# Patient Record
Sex: FEMALE | Race: WHITE
[De-identification: names, ages, dates, MRNs, and addresses within clinical notes are randomized per-mention and may not be internally consistent; named-entity substitution may affect disease eponyms.]

---
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
## 2019-08-24 ENCOUNTER — HOSPITAL ENCOUNTER (EMERGENCY)
Dept: HOSPITAL 63 - ER | Age: 19
Discharge: HOME | End: 2019-08-24
Payer: COMMERCIAL

## 2019-08-24 VITALS — WEIGHT: 146.39 LBS | BODY MASS INDEX: 24.39 KG/M2 | HEIGHT: 65 IN

## 2019-08-24 VITALS
SYSTOLIC BLOOD PRESSURE: 115 MMHG | DIASTOLIC BLOOD PRESSURE: 67 MMHG | DIASTOLIC BLOOD PRESSURE: 67 MMHG | SYSTOLIC BLOOD PRESSURE: 115 MMHG

## 2019-08-24 DIAGNOSIS — N76.0: ICD-10-CM

## 2019-08-24 DIAGNOSIS — R19.7: Primary | ICD-10-CM

## 2019-08-24 DIAGNOSIS — B96.89: ICD-10-CM

## 2019-08-24 DIAGNOSIS — N39.0: ICD-10-CM

## 2019-08-24 DIAGNOSIS — J45.909: ICD-10-CM

## 2019-08-24 DIAGNOSIS — E87.6: ICD-10-CM

## 2019-08-24 LAB
ALBUMIN SERPL-MCNC: 4.3 G/DL (ref 3.4–5)
ALBUMIN/GLOB SERPL: 1 {RATIO} (ref 1–1.7)
ALP SERPL-CCNC: 46 U/L (ref 46–116)
ALT SERPL-CCNC: 11 U/L (ref 14–59)
ANION GAP SERPL CALC-SCNC: 11 MMOL/L (ref 6–14)
APTT PPP: YELLOW S
AST SERPL-CCNC: 13 U/L (ref 15–37)
BACTERIA #/AREA URNS HPF: (no result) /HPF
BASOPHILS # BLD AUTO: 0.1 X10^3/UL (ref 0–0.2)
BASOPHILS NFR BLD: 1 % (ref 0–3)
BILIRUB SERPL-MCNC: 0.3 MG/DL (ref 0.2–1)
BILIRUB UR QL STRIP: (no result)
BUN/CREAT SERPL: 12 (ref 6–20)
CA-I SERPL ISE-MCNC: 12 MG/DL (ref 7–20)
CALCIUM SERPL-MCNC: 8.4 MG/DL (ref 8.5–10.1)
CHLORIDE SERPL-SCNC: 102 MMOL/L (ref 98–107)
CO2 SERPL-SCNC: 26 MMOL/L (ref 21–32)
CREAT SERPL-MCNC: 1 MG/DL (ref 0.6–1)
EOSINOPHIL NFR BLD: 0.3 X10^3/UL (ref 0–0.7)
EOSINOPHIL NFR BLD: 5 % (ref 0–3)
ERYTHROCYTE [DISTWIDTH] IN BLOOD BY AUTOMATED COUNT: 12.3 % (ref 11.5–14.5)
FIBRINOGEN PPP-MCNC: (no result) MG/DL
GFR SERPLBLD BASED ON 1.73 SQ M-ARVRAT: 71.4 ML/MIN
GLOBULIN SER-MCNC: 4.2 G/DL (ref 2.2–3.8)
GLUCOSE SERPL-MCNC: 98 MG/DL (ref 70–99)
GLUCOSE UR STRIP-MCNC: (no result) MG/DL
HCT VFR BLD CALC: 40.3 % (ref 36–47)
HGB BLD-MCNC: 13.7 G/DL (ref 12–15.5)
LIPASE: 120 U/L (ref 73–393)
LYMPHOCYTES # BLD: 2.2 X10^3/UL (ref 1–4.8)
LYMPHOCYTES NFR BLD AUTO: 37 % (ref 24–48)
MCH RBC QN AUTO: 31 PG (ref 25–35)
MCHC RBC AUTO-ENTMCNC: 34 G/DL (ref 31–37)
MCV RBC AUTO: 92 FL (ref 79–100)
MONO #: 0.4 X10^3/UL (ref 0–1.1)
MONOCYTES NFR BLD: 7 % (ref 0–9)
NEUT #: 3 X10^3UL (ref 1.8–7.7)
NEUTROPHILS NFR BLD AUTO: 50 % (ref 31–73)
NITRITE UR QL STRIP: (no result)
PLATELET # BLD AUTO: 384 X10^3/UL (ref 140–400)
POTASSIUM SERPL-SCNC: 3.1 MMOL/L (ref 3.5–5.1)
PROT SERPL-MCNC: 8.5 G/DL (ref 6.4–8.2)
RBC # BLD AUTO: 4.38 X10^6/UL (ref 3.5–5.4)
RBC #/AREA URNS HPF: (no result) /HPF (ref 0–2)
SODIUM SERPL-SCNC: 139 MMOL/L (ref 136–145)
SP GR UR STRIP: >=1.03
SQUAMOUS #/AREA URNS LPF: (no result) /LPF
UROBILINOGEN UR-MCNC: 0.2 MG/DL
WBC # BLD AUTO: 6 X10^3/UL (ref 4–11)
WBC #/AREA URNS HPF: >40 /HPF (ref 0–4)

## 2019-08-24 PROCEDURE — 87086 URINE CULTURE/COLONY COUNT: CPT

## 2019-08-24 PROCEDURE — 87491 CHLMYD TRACH DNA AMP PROBE: CPT

## 2019-08-24 PROCEDURE — 99284 EMERGENCY DEPT VISIT MOD MDM: CPT

## 2019-08-24 PROCEDURE — 96374 THER/PROPH/DIAG INJ IV PUSH: CPT

## 2019-08-24 PROCEDURE — 83690 ASSAY OF LIPASE: CPT

## 2019-08-24 PROCEDURE — 36415 COLL VENOUS BLD VENIPUNCTURE: CPT

## 2019-08-24 PROCEDURE — 85025 COMPLETE CBC W/AUTO DIFF WBC: CPT

## 2019-08-24 PROCEDURE — 96361 HYDRATE IV INFUSION ADD-ON: CPT

## 2019-08-24 PROCEDURE — 81025 URINE PREGNANCY TEST: CPT

## 2019-08-24 PROCEDURE — 81001 URINALYSIS AUTO W/SCOPE: CPT

## 2019-08-24 PROCEDURE — 87591 N.GONORRHOEAE DNA AMP PROB: CPT

## 2019-08-24 PROCEDURE — 80053 COMPREHEN METABOLIC PANEL: CPT

## 2019-08-24 NOTE — PHYS DOC
Past History


Past Medical History:  Asthma


Past Surgical History:  No Surgical History


Smoking:  Non-smoker


Alcohol Use:  None


Drug Use:  None





Adult General


Chief Complaint


Chief Complaint:  ABDOMINAL PAIN





HPI


HPI





Patient is a 9-year-old female presents with diarrhea and "gas pains" diffusely

in her abdomen for the past 2 days. No blood in the stool. She threw up one time

this morning. No blood in the emesis. No sick family contacts. She is feeling 

fatigued generally. No chest pain or palpitations. Nothing makes the symptoms 

better or worse. There has been no out of the country travel. No fevers. She 

denies any vaginal bleeding or discharge. No recent antibiotic therapy.[]





Review of Systems


Review of Systems





Constitutional: Denies fever or chills []


Eyes: Denies change in visual acuity, redness, or eye pain []


HENT: Denies nasal congestion or sore throat []


Respiratory: Denies cough or shortness of breath []


Cardiovascular: No chest pain or palpitations[]


GI: See history of present illness[]


: Denies dysuria or hematuria, there is some urgency with urinating and 

frequency, with small amounts []


Musculoskeletal: Denies back pain or joint pain []


Integument: Denies rash or skin lesions []


Neurologic: Denies headache, focal weakness or sensory changes []


Endocrine: Denies polyuria or polydipsia []





All other systems were reviewed and found to be within normal limits, except as 

documented in this note.





Allergies


Allergies





Allergies








Coded Allergies Type Severity Reaction Last Updated Verified


 


  No Known Drug Allergies    3/23/17 No











Physical Exam


Physical Exam





Constitutional: Well developed, well nourished, no acute distress, non-toxic 

appearance. []


HENT: Normocephalic, atraumatic, bilateral external ears normal, oropharynx 

moist, no oral exudates, nose normal. []


Eyes: PERRLA, EOMI, conjunctiva normal, no discharge. [] 


Neck: Normal range of motion, no tenderness, supple, no stridor. [] 


Cardiovascular:Heart rate is tachycardic with a regular rhythm, no murmur []


Lungs & Thorax:  Bilateral breath sounds clear to auscultation []


Abdomen: Bowel sounds are increased, soft, no tenderness, no rebound, no 

guarding, no rigidity, able to sit up and lie back without any difficulty. No 

masses, no pulsatile masses. [] 


Skin: Warm, dry, no erythema, no rash. [] 


Back: No tenderness, no CVA tenderness. [] 


Extremities: No tenderness, no cyanosis, no clubbing, ROM intact, no edema. [] 


Neurologic: Alert and oriented X 3, normal motor function, normal sensory 

function, no focal deficits noted. []


Psychologic: Affect normal, judgement normal, mood normal. []





Current Patient Data


Lab Results





Laboratory Tests








Test


 8/24/19


13:35 8/24/19


13:37 8/24/19


13:45


 


Urine Collection Type Unknown   


 


Urine Color Yellow   


 


Urine Clarity Hazy   


 


Urine pH 5.0   


 


Urine Specific Gravity >=1.030   


 


Urine Protein Neg   


 


Urine Glucose (UA) Neg mg/dL   


 


Urine Ketones (Stick) Neg mg/dL   


 


Urine Blood Neg   


 


Urine Nitrite Neg   


 


Urine Bilirubin Neg   


 


Urine Urobilinogen Dipstick 0.2 mg/dL   


 


Urine Leukocyte Esterase Mod   


 


Urine RBC 1-2 /HPF   


 


Urine WBC >40 /HPF   


 


Urine Squamous Epithelial


Cells Mod /LPF 


 


 





 


Urine Bacteria Many /HPF   


 


Urine Mucus Marked /LPF   


 


White Blood Count  6.0 x10^3/uL  


 


Red Blood Count  4.38 x10^6/uL  


 


Hemoglobin  13.7 g/dL  


 


Hematocrit  40.3 %  


 


Mean Corpuscular Volume  92 fL  


 


Mean Corpuscular Hemoglobin  31 pg  


 


Mean Corpuscular Hemoglobin


Concent 


 34 g/dL 


 





 


Red Cell Distribution Width  12.3 %  


 


Platelet Count  384 x10^3/uL  


 


Neutrophils (%) (Auto)  50 %  


 


Lymphocytes (%) (Auto)  37 %  


 


Monocytes (%) (Auto)  7 %  


 


Eosinophils (%) (Auto)  5 %  


 


Basophils (%) (Auto)  1 %  


 


Neutrophils # (Auto)  3.0 x10^3uL  


 


Lymphocytes # (Auto)  2.2 x10^3/uL  


 


Monocytes # (Auto)  0.4 x10^3/uL  


 


Eosinophils # (Auto)  0.3 x10^3/uL  


 


Basophils # (Auto)  0.1 x10^3/uL  


 


Sodium Level  139 mmol/L  


 


Potassium Level  3.1 mmol/L  


 


Chloride Level  102 mmol/L  


 


Carbon Dioxide Level  26 mmol/L  


 


Anion Gap  11  


 


Blood Urea Nitrogen  12 mg/dL  


 


Creatinine  1.0 mg/dL  


 


Estimated GFR


(Cockcroft-Gault) 


 71.4 


 





 


BUN/Creatinine Ratio  12  


 


Glucose Level  98 mg/dL  


 


Calcium Level  8.4 mg/dL  


 


Total Bilirubin  0.3 mg/dL  


 


Aspartate Amino Transf


(AST/SGOT) 


 13 U/L 


 





 


Alanine Aminotransferase


(ALT/SGPT) 


 11 U/L 


 





 


Alkaline Phosphatase  46 U/L  


 


Total Protein  8.5 g/dL  


 


Albumin  4.3 g/dL  


 


Albumin/Globulin Ratio  1.0  


 


Lipase  120 U/L  


 


Bedside Urine HCG, Qualitative   hcg negative 








Current Medications








 Medications


  (Trade)  Dose


 Ordered  Sig/Fatemeh


 Route


 PRN Reason  Start Time


 Stop Time Status Last Admin


Dose Admin


 


 Lactated Ringer's  1,000 ml @ 


 1,000 mls/hr  Q1H


 IV


   8/24/19 13:24


 8/24/19 14:23  8/24/19 13:44





 


 Ondansetron HCl


  (Zofran)  4 mg  1X  ONCE


 IV


   8/24/19 13:45


 8/24/19 13:46 DC 8/24/19 13:41





 


 Hyoscyamine


  (Anaspaz)  0.25 mg  1X  ONCE


 PO


   8/24/19 13:45


 8/24/19 13:46 DC 8/24/19 13:44














EKG


EKG


[]





Radiology/Procedures


Radiology/Procedures


[]





Course & Med Decision Making


Course & Med Decision Making


Pertinent Labs and Imaging studies reviewed. (See chart for details)





ED course: Patient arrived, was placed in bed, and tolerated exam well. She had 

significant relief with the medicines and IV fluids administered. Her heart rate

 improved to the 70s. Discussed findings and plan with the patient and family wh

o voiced understanding. All questions were answered. She was discharged in 

improved condition.





Medical decision making: There is no evidence of an obstruction or perforation. 

No evidence of significant electrolyte abnormality. The low potassium is noted a

nd patient was instructed on foods to help with replacement. We will cover for 

urinary tract infection. No evidence of sepsis or pyelonephritis. Will also 

cover for bacterial vaginosis since clue cells were noted on the urine sample. 

Doubt C. difficile given no recent antibiotic therapy. This can be followed as 

an outpatient if current measures fail.[]





Dragon Disclaimer


Dragon Disclaimer


This electronic medical record was generated, in whole or in part, using a voice

 recognition dictation system.





Departure


Departure:


Impression:  


   Primary Impression:  


   Diarrhea


   Additional Impressions:  


   Hypokalemia


   Urinary tract infection


   Bacterial vaginosis


Disposition:  01 HOME, SELF-CARE


Condition:  IMPROVED


Referrals:  


ARIANE RM MD (PCP)


Follow-up in 2 days


Patient Instructions:  Bacterial Vaginosis, Diarrhea, Diet for Diarrhea, Adult, 

Hypokalemia, Urinary Tract Infection





Additional Instructions:  


Drink plenty of fluids, frequent small sips. No fatty foods, no milk, and no 

pepper for the next 48 hours. For the next 48 hours eat a diet rich in 

carbohydrates with foods such as bananas, rice, applesauce, and toast. Follow-up

 with your regular doctor in 2 days. Return to the ER if worsening pain, blood 

in the stool or emesis, or any other concerns.


Scripts


Ondansetron Hcl (ZOFRAN) 4 Mg Tablet


1 TAB PO Q6HRS for nausea or vomiting, #20 TAB


   Prov: ARTIE KWON DO         8/24/19 


Metronidazole (FLAGYL) 500 Mg Tablet


1 TAB PO BID for bacterial vaginosis, #14 TAB


   Prov: ARTIE KWON DO         8/24/19 


Hyoscyamine Sulfate (LEVSIN) 0.125 Mg Tablet


0.125 MG PO QID for abdominal pain/cramping, #30 TAB


   Prov: ARTIE KWON DO         8/24/19 


Sulfamethoxazole/Trimethoprim (BACTRIM DS TABLET) 1 Each Tablet


1 TAB PO BID for urinary tract infection, #20 TAB


   Prov: ARTIE KWON DO         8/24/19





Problem Qualifiers








   Primary Impression:  


   Diarrhea


   Diarrhea type:  unspecified type  Qualified Codes:  R19.7 - Diarrhea, 

   unspecified


   Additional Impressions:  


   Urinary tract infection


   Urinary tract infection type:  site unspecified  Hematuria presence:  without

    hematuria  Qualified Codes:  N39.0 - Urinary tract infection, site not 

   specified








ARTIE KWON DO          Aug 24, 2019 13:31

## 2020-08-06 ENCOUNTER — HOSPITAL ENCOUNTER (OUTPATIENT)
Dept: HOSPITAL 63 - US | Age: 20
End: 2020-08-06
Attending: FAMILY MEDICINE
Payer: COMMERCIAL

## 2020-08-06 DIAGNOSIS — N94.6: Primary | ICD-10-CM

## 2020-08-06 PROCEDURE — 76856 US EXAM PELVIC COMPLETE: CPT

## 2020-08-06 PROCEDURE — 76830 TRANSVAGINAL US NON-OB: CPT

## 2020-08-06 NOTE — RAD
Transabdominal and endovaginal pelvic ultrasound without comparison for 

dysmenorrhea.

 

TECHNIQUE AND FINDINGS: Real-time grayscale and color Doppler evaluation 

of the pelvic organs is performed from both transabdominal and endovaginal

approach. The urinary bladder is decompressed limiting the acoustic window

for transabdominal imaging. The uterus measures 8.4 x 5.2 x 4.1 cm and is 

normal in appearance with no myometrial abnormalities. Endometrium 

measures 2 mm in thickness, also normal. No cervical abnormalities are 

identified. The right ovary measures 5.1 x 2.6 x 2.6 cm and demonstrates 

normal blood flow. There is an eccentric simple appearing 3.6 cm cyst for 

which no additional follow-up is required. The left ovary measures 3.4 x 

2.1 x 2.1 cm and demonstrates normal blood flow and normal follicular 

character. There is no free fluid identified. No masses are seen.

 

IMPRESSION:

1. No sonographically discernible pelvic abnormalities.

 

Electronically signed by: Wesly Valiente MD (8/6/2020 10:53 AM) [id number]

## 2021-03-31 ENCOUNTER — HOSPITAL ENCOUNTER (OUTPATIENT)
Dept: HOSPITAL 61 - RAD | Age: 21
End: 2021-03-31
Attending: INTERNAL MEDICINE
Payer: COMMERCIAL

## 2021-03-31 DIAGNOSIS — J45.909: Primary | ICD-10-CM

## 2021-03-31 PROCEDURE — 71046 X-RAY EXAM CHEST 2 VIEWS: CPT

## 2021-03-31 NOTE — RAD
Exam Date:  3/31/2021 1:18 PM



XR CHEST 2V



Indication: Reason: ASTHMA / Spl. Instructions:  / History:  







FINDINGS/

IMPRESSION:





The cardiac silhouette and pulmonary vasculature are within normal limits.  

There is no focal consolidation, pleural effusion or pneumothorax.  

The visualized osseous structures are intact.





Electronically signed by: King Florentino MD (3/31/2021 4:10 PM) [id number]

## 2021-05-09 ENCOUNTER — HOSPITAL ENCOUNTER (EMERGENCY)
Dept: HOSPITAL 63 - ER | Age: 21
Discharge: HOME | End: 2021-05-09
Payer: COMMERCIAL

## 2021-05-09 VITALS
SYSTOLIC BLOOD PRESSURE: 120 MMHG | SYSTOLIC BLOOD PRESSURE: 120 MMHG | DIASTOLIC BLOOD PRESSURE: 72 MMHG | DIASTOLIC BLOOD PRESSURE: 72 MMHG

## 2021-05-09 VITALS — WEIGHT: 153.22 LBS | HEIGHT: 66 IN | BODY MASS INDEX: 24.62 KG/M2

## 2021-05-09 DIAGNOSIS — J45.909: ICD-10-CM

## 2021-05-09 DIAGNOSIS — J02.8: Primary | ICD-10-CM

## 2021-05-09 DIAGNOSIS — B97.89: ICD-10-CM

## 2021-05-09 PROCEDURE — 99283 EMERGENCY DEPT VISIT LOW MDM: CPT

## 2021-05-09 PROCEDURE — 87880 STREP A ASSAY W/OPTIC: CPT

## 2021-05-09 PROCEDURE — 87070 CULTURE OTHR SPECIMN AEROBIC: CPT

## 2021-05-09 NOTE — PHYS DOC
Past History


Past Medical History:  Asthma


Past Surgical History:  No Surgical History


Smoking:  Non-smoker


Alcohol Use:  None


Drug Use:  None





General Adult


EDM:


Chief Complaint:  SORE THROAT





HPI:


HPI:


20-year-old female presents with sore throat.  She states that she woke up this 

morning with a sore throat and is gotten worse throughout the day.  It hurts 

more with swallowing.  She is concerned about strep.  Patient admits to having 

seasonal allergies and nasal drainage from this.  She has no other complaints at

this time.  Denies fever or chills.





Review of Systems:


Review of Systems:


Constitutional:  Denies fever or chills 


Eyes:  Denies change in visual acuity 


HENT:  sore throat 


Respiratory:  Denies cough or shortness of breath 


Cardiovascular:  Denies chest pain or edema 


GI:  Denies abdominal pain, nausea, vomiting, bloody stools or diarrhea 


: Denies dysuria 


Musculoskeletal:  Denies back pain or joint pain 


Integument:  Denies rash 


Neurologic:  Denies headache, focal weakness or sensory changes 


Endocrine:  Denies polyuria or polydipsia 


Lymphatic:  Denies swollen glands 


Psychiatric:  Denies depression or anxiety





Allergies:


Allergies:





Allergies








Coded Allergies Type Severity Reaction Last Updated Verified


 


  No Known Drug Allergies    3/23/17 No











Physical Exam:


PE:





Constitutional: Well developed, well nourished, no acute distress, non-toxic 

appearance. []


HENT: Normocephalic, atraumatic, bilateral external ears normal, oropharynx 

moist, no oral exudates, nose normal. []


Eyes: PERRLA, EOMI, conjunctiva normal, no discharge. [] 


Neck: Normal range of motion, no tenderness, supple, no stridor.  Mild swelling 

of the left anterior cervical lymph node [] 


Cardiovascular:Heart rate regular rhythm, no murmur []


Lungs & Thorax:  Bilateral breath sounds clear to auscultation []


Abdomen: Bowel sounds normal, soft, no tenderness, no masses, no pulsatile 

masses. [] 


Skin: Warm, dry, no erythema, no rash. [] 


Back: No tenderness, no CVA tenderness. [] 


Extremities: No tenderness, no cyanosis, no clubbing, ROM intact, no edema. [] 


Neurologic: Alert and oriented X 3, normal motor function, normal sensory 

function, no focal deficits noted. []


Psychologic: Affect normal, judgement normal, mood normal. []





EKG:


EKG:


[]





Radiology/Procedures:


Radiology/Procedures:


[]





Heart Score:


C/O Chest Pain:  N/A


Risk Factors:


Risk Factors:  DM, Current or recent (<one month) smoker, HTN, HLP, family 

history of CAD, obesity.


Risk Scores:


Score 0 - 3:  2.5% MACE over next 6 weeks - Discharge Home


Score 4 - 6:  20.3% MACE over next 6 weeks - Admit for Clinical Observation


Score 7 - 10:  72.7% MACE over next 6 weeks - Early Invasive Strategies





Course & Med Decision Making:


Course & Med Decision Making


Pertinent Labs and Imaging studies reviewed. (See chart for details)


The patient's rapid strep is negative.  It appears as though her sore throat is 

either allergy induced due to her nasal drainage or viral illness.  She is 

stable for discharge at this time.


[]





Dragon Disclaimer:


Dragon Disclaimer:


This electronic medical record was generated, in whole or in part, using a voice

 recognition dictation system.





Departure


Departure:


Impression:  


   Primary Impression:  


   Sore throat (viral)


Disposition:  01 HOME / SELF CARE / HOMELESS


Condition:  STABLE


Referrals:  


ARIANE RM MD (PCP)


Patient Instructions:  Sore Throat, Easy-to-Read











XANDER AMAYA DO                  May 9, 2021 19:14

## 2021-12-31 ENCOUNTER — HOSPITAL ENCOUNTER (OUTPATIENT)
Dept: HOSPITAL 63 - RAD | Age: 21
End: 2021-12-31
Attending: FAMILY MEDICINE
Payer: COMMERCIAL

## 2021-12-31 DIAGNOSIS — N13.6: Primary | ICD-10-CM

## 2021-12-31 PROCEDURE — 74176 CT ABD & PELVIS W/O CONTRAST: CPT

## 2021-12-31 NOTE — RAD
EXAMINATION: CT abdomen and pelvis without IV contrast.



INDICATION:21 years, Female, pyelonephritis, increased urgency to urinate.



TECHNIQUE: Axial CT images of the abdomen and pelvis were obtained. Coronal and sagittal reformatted 
performed.



COMPARISON:  None.



Exposure: One or more of the following individualized dose reduction techniques were utilized for thi
s examination:  

1. Automated exposure control  

2. Adjustment of the mA and/or kV according to patient size  

3. Use of iterative reconstruction technique.



FINDINGS:



LOWER CHEST:

Visualized lung bases are clear.   



ABDOMEN/PELVIS:

The liver is normal in size and attenuation in the spleen, adrenals, and pancreas are unremarkable. T
he kidneys are symmetric in size with no discrete focal abnormality on this noncontrast exam. No appa
rent perinephric inflammation. No nephrolithiasis or hydroureteronephrosis.



Stomach is distended with ingested debris. No evidence of bowel obstruction or focal inflammation. Th
ere is a moderate to large amount stool within the rectum. There is moderate amount of air and stool 
seen throughout the colon. The appendix is not definitely visualized. No pathologically enlarged abdo
dar or pelvic adenopathy. Vasculature appears normal in course and caliber.



Bladder is partially decompressed precluding complete evaluation. Anteverted normal appearing uterus 
is present. Ovaries are normal in appearance for patient's age. There is a trace amount of simple thuan
earing fluid in the pelvis, likely physiologic.



Anterior abdominal wall shows no evidence of acute process. No acute or suspicious osseous abnormalit
ies. 



IMPRESSION:

1. No imaging findings to suggest pyelonephritis. Correlation with urinalysis is recommended.

2. Moderate colorectal stool burden suggesting moderate constipation. Clinical correlation is advised
.

3. Other chronic/incidental findings, as above.



Electronically signed by: Kenroy Cain DO (12/31/2021 8:50 PM) [facility]

## 2022-01-02 ENCOUNTER — HOSPITAL ENCOUNTER (EMERGENCY)
Dept: HOSPITAL 63 - ER | Age: 22
Discharge: HOME | End: 2022-01-02
Payer: COMMERCIAL

## 2022-01-02 VITALS — DIASTOLIC BLOOD PRESSURE: 74 MMHG | SYSTOLIC BLOOD PRESSURE: 132 MMHG

## 2022-01-02 VITALS — WEIGHT: 153.22 LBS | BODY MASS INDEX: 24.62 KG/M2 | HEIGHT: 66 IN

## 2022-01-02 DIAGNOSIS — J45.909: ICD-10-CM

## 2022-01-02 DIAGNOSIS — R35.0: Primary | ICD-10-CM

## 2022-01-02 LAB
ALBUMIN SERPL-MCNC: 3.8 G/DL (ref 3.4–5)
ALBUMIN/GLOB SERPL: 1 {RATIO} (ref 1–1.7)
ALP SERPL-CCNC: 34 U/L (ref 46–116)
ALT SERPL-CCNC: 35 U/L (ref 14–59)
AMPHETAMINE/METHAMPHETAMINE: (no result)
ANION GAP SERPL CALC-SCNC: 11 MMOL/L (ref 6–14)
APTT PPP: (no result) S
AST SERPL-CCNC: 151 U/L (ref 15–37)
BACTERIA #/AREA URNS HPF: (no result) /HPF
BARBITURATES UR-MCNC: (no result) UG/ML
BASOPHILS # BLD AUTO: 0.1 X10^3/UL (ref 0–0.2)
BASOPHILS NFR BLD: 1 % (ref 0–3)
BENZODIAZ UR-MCNC: (no result) UG/L
BILIRUB SERPL-MCNC: 0.2 MG/DL (ref 0.2–1)
BILIRUB UR QL STRIP: (no result)
BUN/CREAT SERPL: 10 (ref 6–20)
CA-I SERPL ISE-MCNC: 9 MG/DL (ref 7–20)
CALCIUM SERPL-MCNC: 8 MG/DL (ref 8.5–10.1)
CANNABINOIDS UR-MCNC: (no result) UG/L
CHLORIDE SERPL-SCNC: 104 MMOL/L (ref 98–107)
CO2 SERPL-SCNC: 24 MMOL/L (ref 21–32)
COCAINE UR-MCNC: (no result) NG/ML
CREAT SERPL-MCNC: 0.9 MG/DL (ref 0.6–1)
EOSINOPHIL NFR BLD: 0.5 X10^3/UL (ref 0–0.7)
EOSINOPHIL NFR BLD: 7 % (ref 0–3)
ERYTHROCYTE [DISTWIDTH] IN BLOOD BY AUTOMATED COUNT: 12.9 % (ref 11.5–14.5)
FIBRINOGEN PPP-MCNC: CLEAR MG/DL
GFR SERPLBLD BASED ON 1.73 SQ M-ARVRAT: 78.3 ML/MIN
GLOBULIN SER-MCNC: 3.7 G/DL (ref 2.2–3.8)
GLUCOSE SERPL-MCNC: 83 MG/DL (ref 70–99)
GLUCOSE UR STRIP-MCNC: (no result) MG/DL
HCT VFR BLD CALC: 34.4 % (ref 36–47)
HGB BLD-MCNC: 11.4 G/DL (ref 12–15.5)
LYMPHOCYTES # BLD: 2.3 X10^3/UL (ref 1–4.8)
LYMPHOCYTES NFR BLD AUTO: 29 % (ref 24–48)
MCH RBC QN AUTO: 30 PG (ref 25–35)
MCHC RBC AUTO-ENTMCNC: 33 G/DL (ref 31–37)
MCV RBC AUTO: 92 FL (ref 79–100)
METHADONE SERPL-MCNC: (no result) NG/ML
MONO #: 0.6 X10^3/UL (ref 0–1.1)
MONOCYTES NFR BLD: 8 % (ref 0–9)
NEUT #: 4.3 X10^3UL (ref 1.8–7.7)
NEUTROPHILS NFR BLD AUTO: 55 % (ref 31–73)
NITRITE UR QL STRIP: (no result)
OPIATES UR-MCNC: (no result) NG/ML
PCP SERPL-MCNC: (no result) MG/DL
PLATELET # BLD AUTO: 297 X10^3/UL (ref 140–400)
POTASSIUM SERPL-SCNC: 3.9 MMOL/L (ref 3.5–5.1)
PROT SERPL-MCNC: 7.5 G/DL (ref 6.4–8.2)
RBC # BLD AUTO: 3.74 X10^6/UL (ref 3.5–5.4)
RBC #/AREA URNS HPF: 0 /HPF (ref 0–2)
SODIUM SERPL-SCNC: 139 MMOL/L (ref 136–145)
SP GR UR STRIP: 1.01
SQUAMOUS #/AREA URNS LPF: (no result) /LPF
UROBILINOGEN UR-MCNC: 0.2 MG/DL
WBC # BLD AUTO: 7.7 X10^3/UL (ref 4–11)
WBC #/AREA URNS HPF: (no result) /HPF (ref 0–4)

## 2022-01-02 PROCEDURE — 96360 HYDRATION IV INFUSION INIT: CPT

## 2022-01-02 PROCEDURE — 85025 COMPLETE CBC W/AUTO DIFF WBC: CPT

## 2022-01-02 PROCEDURE — 87491 CHLMYD TRACH DNA AMP PROBE: CPT

## 2022-01-02 PROCEDURE — 87591 N.GONORRHOEAE DNA AMP PROB: CPT

## 2022-01-02 PROCEDURE — 36415 COLL VENOUS BLD VENIPUNCTURE: CPT

## 2022-01-02 PROCEDURE — 80307 DRUG TEST PRSMV CHEM ANLYZR: CPT

## 2022-01-02 PROCEDURE — 80053 COMPREHEN METABOLIC PANEL: CPT

## 2022-01-02 PROCEDURE — 99283 EMERGENCY DEPT VISIT LOW MDM: CPT

## 2022-01-02 PROCEDURE — 81001 URINALYSIS AUTO W/SCOPE: CPT

## 2022-01-02 PROCEDURE — 81025 URINE PREGNANCY TEST: CPT

## 2022-01-02 NOTE — PHYS DOC
Past History


Past Medical History:  Asthma


Past Surgical History:  No Surgical History


Smoking:  Non-smoker


Alcohol Use:  None


Drug Use:  None





General Adult


EDM:


Chief Complaint:  URINARY FREQUENCY





HPI:


HPI:


21-year-old female presents with urinary frequency.  The patient has been having

these symptoms on and off for at least a month.  She has been treated for UTIs 

twice.  Her symptoms seem to improve and then they come back.  She is sexually 

active.  She denies putting anything into her urethra.  She has not seen a 

urologist yet.  She denies fever or chills.





Review of Systems:


Review of Systems:


Constitutional:  Denies fever or chills 


Eyes:  Denies change in visual acuity 


HENT:  Denies nasal congestion or sore throat 


Respiratory:  Denies cough or shortness of breath 


Cardiovascular:  Denies chest pain or edema 


GI:  Denies abdominal pain, nausea, vomiting, bloody stools or diarrhea 


: Urinary frequency


Musculoskeletal:  Denies back pain or joint pain 


Integument:  Denies rash 


Neurologic:  Denies headache, focal weakness or sensory changes 


Endocrine:  Denies polyuria or polydipsia 


Lymphatic:  Denies swollen glands 


Psychiatric:  Denies depression or anxiety





Current Medications:


Current Meds:





Current Medications








 Medications


  (Trade)  Dose


 Ordered  Sig/Fatemeh  Start Time


 Stop Time Status Last Admin


Dose Admin


 


 Sodium Chloride  1,000 ml @ 


 1,000 mls/hr  1X  ONCE  1/2/22 03:30


 1/2/22 04:29 DC 1/2/22 03:18


1,000 MLS/HR











Allergies:


Allergies:





Allergies








Coded Allergies Type Severity Reaction Last Updated Verified


 


  No Known Drug Allergies    3/23/17 No











Physical Exam:


PE:





Constitutional: Well developed, well nourished, no acute distress, non-toxic 

appearance. []


HENT: Normocephalic, atraumatic, bilateral external ears normal, oropharynx 

moist, no oral exudates, nose normal. []


Eyes: PERRLA, EOMI, conjunctiva normal, no discharge. [] 


Neck: Normal range of motion, no tenderness, supple, no stridor. [] 


Cardiovascular: Heart rate regular rhythm, no murmur []


Lungs & Thorax:  Bilateral breath sounds clear to auscultation []


Abdomen: Bowel sounds normal, soft, no tenderness, no masses, no pulsatile 

masses. [] 


Skin: Warm, dry, no erythema, no rash. [] 


Back: No tenderness, no CVA tenderness. [] 


Extremities: No tenderness, no cyanosis, no clubbing, ROM intact, no edema. [] 


Neurologic: Alert and oriented X 3, normal motor function, normal sensory 

function, no focal deficits noted. []


Psychologic: Affect normal, judgement normal, mood anxious. 


: Normal external genitalia, clear to white vaginal discharge, no foul odor, 

no pain with exam.  []





Current Patient Data:


Labs:





                                Laboratory Tests








Test


 1/2/22


01:40 1/2/22


01:45 1/2/22


03:15


 


Urine Collection Type Unknown    


 


Urine Color Straw    


 


Urine Clarity Clear    


 


Urine pH 6.5    


 


Urine Specific Gravity 1.010    


 


Urine Protein


 Neg


(NEG-TRACE) 


 





 


Urine Glucose (UA)


 Neg mg/dL


(NEG) 


 





 


Urine Ketones (Stick)


 Neg mg/dL


(NEG) 


 





 


Urine Blood Neg (NEG)    


 


Urine Nitrite Neg (NEG)    


 


Urine Bilirubin Neg (NEG)    


 


Urine Urobilinogen Dipstick


 0.2 mg/dL (0.2


mg/dL) 


 





 


Urine Leukocyte Esterase Neg (NEG)    


 


Urine RBC 0 /HPF (0-2)    


 


Urine WBC


 Occ /HPF (0-4)


 


 





 


Urine Squamous Epithelial


Cells Few /LPF  


 


 





 


Urine Bacteria


 Few /HPF


(0-FEW) 


 





 


Urine Opiates Screen Neg (NEG)    


 


Urine Methadone Screen Neg (NEG)    


 


Urine Barbiturates Neg (NEG)    


 


Urine Phencyclidine Screen Neg (NEG)    


 


Urine


Amphetamine/Methamphetamine Neg (NEG)  


 


 





 


Urine Benzodiazepines Screen Neg (NEG)    


 


Urine Cocaine Screen Neg (NEG)    


 


Urine Cannabinoids Screen Neg (NEG)    


 


Urine Ethyl Alcohol Neg (NEG)    


 


POC Urine HCG, Qualitative


 


 hcg negative


(Negative) 





 


White Blood Count


 


 


 7.7 x10^3/uL


(4.0-11.0)


 


Red Blood Count


 


 


 3.74 x10^6/uL


(3.50-5.40)


 


Hemoglobin


 


 


 11.4 g/dL


(12.0-15.5)  L


 


Hematocrit


 


 


 34.4 %


(36.0-47.0)  L


 


Mean Corpuscular Volume


 


 


 92 fL ()





 


Mean Corpuscular Hemoglobin   30 pg (25-35)  


 


Mean Corpuscular Hemoglobin


Concent 


 


 33 g/dL


(31-37)


 


Red Cell Distribution Width


 


 


 12.9 %


(11.5-14.5)


 


Platelet Count


 


 


 297 x10^3/uL


(140-400)


 


Neutrophils (%) (Auto)   55 % (31-73)  


 


Lymphocytes (%) (Auto)   29 % (24-48)  


 


Monocytes (%) (Auto)   8 % (0-9)  


 


Eosinophils (%) (Auto)   7 % (0-3)  H


 


Basophils (%) (Auto)   1 % (0-3)  


 


Neutrophils # (Auto)


 


 


 4.3 x10^3uL


(1.8-7.7)


 


Lymphocytes # (Auto)


 


 


 2.3 x10^3/uL


(1.0-4.8)


 


Monocytes # (Auto)


 


 


 0.6 x10^3/uL


(0.0-1.1)


 


Eosinophils # (Auto)


 


 


 0.5 x10^3/uL


(0.0-0.7)


 


Basophils # (Auto)


 


 


 0.1 x10^3/uL


(0.0-0.2)


 


Sodium Level


 


 


 139 mmol/L


(136-145)


 


Potassium Level


 


 


 3.9 mmol/L


(3.5-5.1)


 


Chloride Level


 


 


 104 mmol/L


()


 


Carbon Dioxide Level


 


 


 24 mmol/L


(21-32)


 


Anion Gap   11 (6-14)  


 


Blood Urea Nitrogen


 


 


 9 mg/dL (7-20)





 


Creatinine


 


 


 0.9 mg/dL


(0.6-1.0)


 


Estimated GFR


(Cockcroft-Gault) 


 


 78.3  





 


BUN/Creatinine Ratio   10 (6-20)  


 


Glucose Level


 


 


 83 mg/dL


(70-99)


 


Calcium Level


 


 


 8.0 mg/dL


(8.5-10.1)  L


 


Total Bilirubin


 


 


 0.2 mg/dL


(0.2-1.0)


 


Aspartate Amino Transferase


(AST) 


 


 151 U/L


(15-37)  H


 


Alanine Aminotransferase (ALT)


 


 


 35 U/L (14-59)





 


Alkaline Phosphatase


 


 


 34 U/L


()  L


 


Total Protein


 


 


 7.5 g/dL


(6.4-8.2)


 


Albumin


 


 


 3.8 g/dL


(3.4-5.0)


 


Albumin/Globulin Ratio   1.0 (1.0-1.7)  








Microbiology


1/2/22 Wet Prep - Final, Complete


Vital Signs:





                                   Vital Signs








  Date Time  Temp Pulse Resp B/P (MAP) Pulse Ox O2 Delivery O2 Flow Rate FiO2


 


1/2/22 01:50 97.8 91 16 127/64 (85) 100   











EKG:


EKG:


[]





Radiology/Procedures:


Radiology/Procedures:


[]





Heart Score:


C/O Chest Pain:  N/A


Risk Factors:


Risk Factors:  DM, Current or recent (<one month) smoker, HTN, HLP, family 

history of CAD, obesity.


Risk Scores:


Score 0 - 3:  2.5% MACE over next 6 weeks - Discharge Home


Score 4 - 6:  20.3% MACE over next 6 weeks - Admit for Clinical Observation


Score 7 - 10:  72.7% MACE over next 6 weeks - Early Invasive Strategies





Course & Med Decision Making:


Course & Med Decision Making


Pertinent Labs and Imaging studies reviewed. (See chart for details)


The patient's labs are unremarkable.  Her urinalysis is unremarkable.  Her wet 

prep is negative.  GC chlamydia is pending.  I have advised that she follow-up 

with urology as previously planned.  She is stable for discharge at this time.


[]





Dragon Disclaimer:


Dragon Disclaimer:


This electronic medical record was generated, in whole or in part, using a voice

 recognition dictation system.





Departure


Departure:


Impression:  


   Primary Impression:  


   Urinary frequency


Disposition:  01 HOME / SELF CARE / HOMELESS


Condition:  STABLE


Referrals:  


JUDITH CHIN MD (PCP)


Patient Instructions:  Urinary Frequency











XANDER AMAYA DO                  Jan 2, 2022 04:45

## 2022-01-06 ENCOUNTER — HOSPITAL ENCOUNTER (EMERGENCY)
Dept: HOSPITAL 63 - ER | Age: 22
Discharge: HOME | End: 2022-01-06
Payer: COMMERCIAL

## 2022-01-06 VITALS — HEIGHT: 66 IN | WEIGHT: 150.36 LBS | BODY MASS INDEX: 24.16 KG/M2

## 2022-01-06 VITALS
SYSTOLIC BLOOD PRESSURE: 111 MMHG | SYSTOLIC BLOOD PRESSURE: 111 MMHG | DIASTOLIC BLOOD PRESSURE: 68 MMHG | DIASTOLIC BLOOD PRESSURE: 68 MMHG

## 2022-01-06 DIAGNOSIS — R55: Primary | ICD-10-CM

## 2022-01-06 DIAGNOSIS — J45.909: ICD-10-CM

## 2022-01-06 DIAGNOSIS — Z20.822: ICD-10-CM

## 2022-01-06 DIAGNOSIS — R35.0: ICD-10-CM

## 2022-01-06 DIAGNOSIS — R41.0: ICD-10-CM

## 2022-01-06 LAB
ALBUMIN SERPL-MCNC: 3.8 G/DL (ref 3.4–5)
ALBUMIN/GLOB SERPL: 1 {RATIO} (ref 1–1.7)
ALP SERPL-CCNC: 34 U/L (ref 46–116)
ALT SERPL-CCNC: 59 U/L (ref 14–59)
AMPHETAMINE/METHAMPHETAMINE: (no result)
ANION GAP SERPL CALC-SCNC: 13 MMOL/L (ref 6–14)
APTT PPP: YELLOW S
AST SERPL-CCNC: 91 U/L (ref 15–37)
BACTERIA #/AREA URNS HPF: (no result) /HPF
BARBITURATES UR-MCNC: (no result) UG/ML
BASOPHILS # BLD AUTO: 0.1 X10^3/UL (ref 0–0.2)
BASOPHILS NFR BLD: 1 % (ref 0–3)
BENZODIAZ UR-MCNC: (no result) UG/L
BILIRUB SERPL-MCNC: 0.3 MG/DL (ref 0.2–1)
BILIRUB UR QL STRIP: (no result)
BUN/CREAT SERPL: 11 (ref 6–20)
CA-I SERPL ISE-MCNC: 10 MG/DL (ref 7–20)
CALCIUM SERPL-MCNC: 8.3 MG/DL (ref 8.5–10.1)
CANNABINOIDS UR-MCNC: (no result) UG/L
CHLORIDE SERPL-SCNC: 104 MMOL/L (ref 98–107)
CO2 SERPL-SCNC: 23 MMOL/L (ref 21–32)
COCAINE UR-MCNC: (no result) NG/ML
CREAT SERPL-MCNC: 0.9 MG/DL (ref 0.6–1)
EOSINOPHIL NFR BLD: 0.3 X10^3/UL (ref 0–0.7)
EOSINOPHIL NFR BLD: 5 % (ref 0–3)
ERYTHROCYTE [DISTWIDTH] IN BLOOD BY AUTOMATED COUNT: 12.9 % (ref 11.5–14.5)
FIBRINOGEN PPP-MCNC: CLEAR MG/DL
GFR SERPLBLD BASED ON 1.73 SQ M-ARVRAT: 78.3 ML/MIN
GLOBULIN SER-MCNC: 3.8 G/DL (ref 2.2–3.8)
GLUCOSE SERPL-MCNC: 106 MG/DL (ref 70–99)
GLUCOSE UR STRIP-MCNC: (no result) MG/DL
HCT VFR BLD CALC: 37 % (ref 36–47)
HGB BLD-MCNC: 12.4 G/DL (ref 12–15.5)
LYMPHOCYTES # BLD: 2.4 X10^3/UL (ref 1–4.8)
LYMPHOCYTES NFR BLD AUTO: 45 % (ref 24–48)
MCH RBC QN AUTO: 31 PG (ref 25–35)
MCHC RBC AUTO-ENTMCNC: 34 G/DL (ref 31–37)
MCV RBC AUTO: 92 FL (ref 79–100)
METHADONE SERPL-MCNC: (no result) NG/ML
MONO #: 0.3 X10^3/UL (ref 0–1.1)
MONOCYTES NFR BLD: 6 % (ref 0–9)
NEUT #: 2.3 X10^3UL (ref 1.8–7.7)
NEUTROPHILS NFR BLD AUTO: 43 % (ref 31–73)
NITRITE UR QL STRIP: (no result)
OPIATES UR-MCNC: (no result) NG/ML
PCP SERPL-MCNC: (no result) MG/DL
PLATELET # BLD AUTO: 359 X10^3/UL (ref 140–400)
POTASSIUM SERPL-SCNC: 4.1 MMOL/L (ref 3.5–5.1)
PROT SERPL-MCNC: 7.6 G/DL (ref 6.4–8.2)
RBC # BLD AUTO: 4.03 X10^6/UL (ref 3.5–5.4)
RBC #/AREA URNS HPF: 0 /HPF (ref 0–2)
SODIUM SERPL-SCNC: 140 MMOL/L (ref 136–145)
SP GR UR STRIP: >=1.03
SQUAMOUS #/AREA URNS LPF: (no result) /LPF
UROBILINOGEN UR-MCNC: 0.2 MG/DL
WBC # BLD AUTO: 5.4 X10^3/UL (ref 4–11)
WBC #/AREA URNS HPF: (no result) /HPF (ref 0–4)

## 2022-01-06 PROCEDURE — 96361 HYDRATE IV INFUSION ADD-ON: CPT

## 2022-01-06 PROCEDURE — 70450 CT HEAD/BRAIN W/O DYE: CPT

## 2022-01-06 PROCEDURE — C9803 HOPD COVID-19 SPEC COLLECT: HCPCS

## 2022-01-06 PROCEDURE — 81025 URINE PREGNANCY TEST: CPT

## 2022-01-06 PROCEDURE — 93005 ELECTROCARDIOGRAM TRACING: CPT

## 2022-01-06 PROCEDURE — 81001 URINALYSIS AUTO W/SCOPE: CPT

## 2022-01-06 PROCEDURE — 80307 DRUG TEST PRSMV CHEM ANLYZR: CPT

## 2022-01-06 PROCEDURE — 86592 SYPHILIS TEST NON-TREP QUAL: CPT

## 2022-01-06 PROCEDURE — 85025 COMPLETE CBC W/AUTO DIFF WBC: CPT

## 2022-01-06 PROCEDURE — 80053 COMPREHEN METABOLIC PANEL: CPT

## 2022-01-06 PROCEDURE — 71045 X-RAY EXAM CHEST 1 VIEW: CPT

## 2022-01-06 PROCEDURE — U0003 INFECTIOUS AGENT DETECTION BY NUCLEIC ACID (DNA OR RNA); SEVERE ACUTE RESPIRATORY SYNDROME CORONAVIRUS 2 (SARS-COV-2) (CORONAVIRUS DISEASE [COVID-19]), AMPLIFIED PROBE TECHNIQUE, MAKING USE OF HIGH THROUGHPUT TECHNOLOGIES AS DESCRIBED BY CMS-2020-01-R: HCPCS

## 2022-01-06 PROCEDURE — 99285 EMERGENCY DEPT VISIT HI MDM: CPT

## 2022-01-06 PROCEDURE — 36415 COLL VENOUS BLD VENIPUNCTURE: CPT

## 2022-01-06 PROCEDURE — 87086 URINE CULTURE/COLONY COUNT: CPT

## 2022-01-06 PROCEDURE — 96360 HYDRATION IV INFUSION INIT: CPT

## 2022-01-06 PROCEDURE — 86703 HIV-1/HIV-2 1 RESULT ANTBDY: CPT

## 2022-01-06 PROCEDURE — 84443 ASSAY THYROID STIM HORMONE: CPT

## 2022-01-06 NOTE — PHYS DOC
Past History


Past Medical History:  Asthma


Past Surgical History:  No Surgical History


Smoking:  Non-smoker


Alcohol Use:  None


Drug Use:  None





Adult General


Chief Complaint


Chief Complaint:  SYNCOPE





HPI


HPI





Patient is a 21-year-old female presenting with father for confusion.  This is a

subacute issue.  Patient was apparently seen for generalized abdominal pain and 

dysuria 6 days prior at our facility and had comprehensive work-up that was 

overall nonconcerning with grossly unremarkable labs, urinalysis and CT abdomen 

pelvis showing significant stool burden only.  Patient has reportedly been more 

confused since discharge home.  She was seen by her primary care physician and 

had repeat labs performed that were again nonconcerning.  Prior to arrival, 

patient reportedly told her father that she was confused and shortly afterwards,

father heard a thud.  Patient reported that she fell/passed out and is unable to

say if she hit her head, states she does not have recollection of what happened 

prompting father to bring patient in for evaluation.  On arrival, patient has no

complaints, just states " I am confused... Am I being treated for a UTI?".  She 

has history of asthma but no other medical conditions with no recent antibiotic 

use or other significant changes in baseline health.  Mother was called at 

bedside and assisted with history stating that previous urinalysis was cultured 

with grossly unremarkable findings concerning for contaminant only, she is 

concerned the patient is dehydrated as she has not been drinking more fluids 

than usual.  There is no other immunocompromising diseases, patient is 

up-to-date on all shots including COVID-19





Review of Systems


Review of Systems


Fourteen body systems of review of systems have been reviewed. See HPI for 

pertinent positives and negative responses, other wise all other systems are 

negative, non-pertinent or non-contributory





Allergies


Allergies





Allergies








Coded Allergies Type Severity Reaction Last Updated Verified


 


  No Known Drug Allergies    3/23/17 No











Physical Exam


Physical Exam


Constitutional: Well developed, well nourished, no acute distress, non-toxic 

appearance. 


HENT: Normocephalic, atraumatic, bilateral external ears normal, oropharynx 

moist, no oral exudates, nose normal. 


Eyes: PERRLA, EOMI, conjunctiva normal, no discharge.  


Neck: Normal range of motion, no tenderness, supple, no stridor.  No meningeal 

signs or nuchal rigidity


Cardiovascular: Heart rate regular, sinus rhythm, no murmurs rubs or gallops


Lungs & Thorax:  Bilateral breath sounds clear to auscultation 


Abdomen: Bowel sounds normal, soft, no tenderness, no masses, no pulsatile 

masses.  Nonsurgical abdomen, no peritoneal signs


Skin: Warm, dry, no erythema, no rash.  


Back: No tenderness, no CVA tenderness.  


Extremities: No tenderness, no cyanosis, no clubbing, ROM intact, no edema.  


Neurologic: Alert and oriented X 3, GCS 15, cranial nerves II through XII 

intact, normal motor & sensory function, no focal deficits noted. 


Psychologic: Anxious affect and mood.  Patient able to track conversation but 

occasionally stops midsentence and states " I do not know I am confused"





Current Patient Data


Vital Signs





Vital Signs








  Date Time  Temp Pulse Resp B/P (MAP) Pulse Ox O2 Delivery O2 Flow Rate FiO2


 


1/6/22 15:43 98.3 89 16 114/72 (86) 100 Room Air  








Vital Signs








  Date Time  Temp Pulse Resp B/P (MAP) Pulse Ox O2 Delivery O2 Flow Rate FiO2


 


1/6/22 15:43 98.3 89 16 114/72 (86) 100 Room Air  








Lab Results





Laboratory Tests








Test


 1/6/22


16:05 1/6/22


16:10 1/6/22


16:12 1/6/22


16:32


 


White Blood Count 5.4 x10^3/uL    


 


Red Blood Count 4.03 x10^6/uL    


 


Hemoglobin 12.4 g/dL    


 


Hematocrit 37.0 %    


 


Mean Corpuscular Volume 92 fL    


 


Mean Corpuscular Hemoglobin 31 pg    


 


Mean Corpuscular Hemoglobin


Concent 34 g/dL 


 


 


 





 


Red Cell Distribution Width 12.9 %    


 


Platelet Count 359 x10^3/uL    


 


Neutrophils (%) (Auto) 43 %    


 


Lymphocytes (%) (Auto) 45 %    


 


Monocytes (%) (Auto) 6 %    


 


Eosinophils (%) (Auto) 5 %    


 


Basophils (%) (Auto) 1 %    


 


Neutrophils # (Auto) 2.3 x10^3uL    


 


Lymphocytes # (Auto) 2.4 x10^3/uL    


 


Monocytes # (Auto) 0.3 x10^3/uL    


 


Eosinophils # (Auto) 0.3 x10^3/uL    


 


Basophils # (Auto) 0.1 x10^3/uL    


 


Sodium Level 140 mmol/L    


 


Potassium Level 4.1 mmol/L    


 


Chloride Level 104 mmol/L    


 


Carbon Dioxide Level 23 mmol/L    


 


Anion Gap 13    


 


Blood Urea Nitrogen 10 mg/dL    


 


Creatinine 0.9 mg/dL    


 


Estimated GFR


(Cockcroft-Gault) 78.3 


 


 


 





 


BUN/Creatinine Ratio 11    


 


Glucose Level 106 mg/dL    


 


Calcium Level 8.3 mg/dL    


 


Total Bilirubin 0.3 mg/dL    


 


Aspartate Amino Transf


(AST/SGOT) 91 U/L 


 


 


 





 


Alanine Aminotransferase


(ALT/SGPT) 59 U/L 


 


 


 





 


Alkaline Phosphatase 34 U/L    


 


Total Protein 7.6 g/dL    


 


Albumin 3.8 g/dL    


 


Albumin/Globulin Ratio 1.0    


 


Urine Opiates Screen  Neg   


 


Urine Methadone Screen  Neg   


 


Urine Barbiturates  Neg   


 


Urine Phencyclidine Screen  Neg   


 


Urine


Amphetamine/Methamphetamine 


 Neg 


 


 





 


Urine Benzodiazepines Screen  Neg   


 


Urine Cocaine Screen  Neg   


 


Urine Cannabinoids Screen  Neg   


 


Urine Ethyl Alcohol  Neg   


 


Bedside Urine HCG, Qualitative   hcg negative  


 


Coronavirus (COVID-19)(PCR)    Not detected 


 


Test


 1/6/22


16:45 


 


 





 


Urine Collection Type Unknown    


 


Urine Color Yellow    


 


Urine Clarity Clear    


 


Urine pH 6.0    


 


Urine Specific Gravity >=1.030    


 


Urine Protein 100 mg/dl    


 


Urine Glucose (UA) Neg mg/dL    


 


Urine Ketones (Stick) Trace mg/dL    


 


Urine Blood Neg    


 


Urine Nitrite Neg    


 


Urine Bilirubin Neg    


 


Urine Urobilinogen Dipstick 0.2 mg/dL    


 


Urine Leukocyte Esterase Neg    


 


Urine RBC 0 /HPF    


 


Urine WBC 1-4 /HPF    


 


Urine Squamous Epithelial


Cells Mod /LPF 


 


 


 





 


Urine Bacteria Mod /HPF    


 


Urine Mucus Mod /LPF    








Current Medications








 Medications


  (Trade)  Dose


 Ordered  Sig/Fatemeh


 Route


 PRN Reason  Start Time


 Stop Time Status Last Admin


Dose Admin


 


 Sodium Chloride  1,000 ml @ 


 1,000 mls/hr  1X  ONCE


 IV


   1/6/22 16:00


 1/6/22 16:59 DC 1/6/22 16:00














EKG


EKG


EKG ordered and interpreted by myself at 1643 hrs. sinus rhythm at 91 bpm, 

unremarkable intervals, no axis deviation, no acute ischemic findings, no STEMI





Radiology/Procedures


Radiology/Procedures





CT HEAD/BRAIN WO





History: Reason: confusion, unwitnessed fall / Spl. Instructions:  / History: 





Comparison: None.





Technique: Noncontrast CT imaging was performed of the head.  





Exposure: One or more of the following individualized dose reduction techniques 

were utilized for this examination:  


1. Automated exposure control  


2. Adjustment of the mA and/or kV according to patient size  


3. Use of iterative reconstruction technique.





Findings: 


No intracranial hemorrhage. No mass effect. No hydrocephalus.  Extra-axial 

spaces are unremarkable.





Imaged orbits are unremarkable. Imaged paranasal sinuses and mastoid air cells 

are clear. No acute calvarial fracture.





Impression:


1.  No acute intracranial abnormality. 





Electronically signed by: Edi Landeros DO (1/6/2022 4:38 PM) [facility]





////////////








XR CHEST 1V





History: Reason: confusion / Spl. Instructions:  / History: 





Comparison: March 31, 2021





Findings:


No consolidation or pleural effusion. Normal heart size. No pneumothorax.





Impression: 


1.  No acute cardiopulmonary process.





Electronically signed by: Edi Landeros DO (1/6/2022 4:34 PM) [facility]





Heart Score


C/O Chest Pain:  No


Risk Factors:


Risk Factors:  DM, Current or recent (<one month) smoker, HTN, HLP, family 

history of CAD, obesity.


Risk Scores:


Risk Factors:  DM, Current or recent (<one month) smoker, HTN, HLP, family 

history of CAD, obesity.





Course & Med Decision Making


Course & Med Decision Making


ABCs unremarkable


HPI physical exam and comprehensive ER work-up nonconcerning for any emergent or

surgical issues


Patient has not had changes in motor or sensory or neuro function.  I disclosed 

with patient and father at bedside no immediate indication for further 

diagnostic work-up such as lumbar puncture or other ER intervention


Patient demonstrated full capacity throughout entirety of ER visit.  She 

continues to periodically state she is confused but is well in tune with what is

going on.  She pulled myself and other healthcare providers aside to that if she

is pregnant to not inform father that he was here at bedside


Later when mother is at bedside I reviewed entirety of ER work-up with mother 

and daughter as mother is a nurse.  I disclosed all unremarkable findings.  I 

offered send out tests such as thyroid, HIV and syphilis but discussed low yield

in these tests despite patient having risk factors


I discussed potential outpatient courses of action that could include 

cardiology/loop recorder placement for suspected syncopal episodes, neurology 

consultation for suspected episodes of confusion, and urology consultation for 

urinary frequency most prevalent after sex


Throughout entirety of this deep conversation with mother, patient AOx4, GCS 15 

and engaged in conversation.  I suspect there is a likely underlying/undiagnosed

psychiatric condition that is likely contributing to patient's repeat ER 

evaluations that should also be evaluated for


Ultimately, I disclosed little indication for further diagnostic work-up or need

for hospitalization at present, patient discharged home





Dragon Disclaimer


Dragon Disclaimer


This electronic medical record was generated, in whole or in part, using a voice

recognition dictation system.





Departure


Departure:


Impression:  


   Primary Impression:  


   Urinary frequency


   Additional Impressions:  


   Confusion


   Syncopal episodes


Disposition:  01 HOME / SELF CARE / HOMELESS


Condition:  STABLE


Referrals:  


JUDITH CHIN MD (PCP)





Additional Instructions:  


As discussed prior to ER departure, your vitals, physical exam and comprehensive

ER work-up were nonconcerning for any obvious emergent or surgical issues.  As 

disclosed, there are several outstanding send out labs such as thyroid, HIV and 

syphilis tests that are pending and should be followed up with your primary care

physician.  As disclosed, there was no indication for further diagnostic work-up

in ER setting and/or hospitalization.  You should follow-up with your primary 

care physician for further outpatient management.  I would recommend he see 

neurology for consultation of presyncopal/syncopal episodes, keep your 

previously scheduled urology visit for ongoing urinary frequency without obvious

causes and/or infection, and establish care with a cardiologist for 

consideration of loop recorder or other event recorder.  If any concerning signs

or symptoms present prior to outpatient follow-up please do not hesitate to come

back for repeat evaluation.  It was pleasure to take care of you and I wish you 

the best going forward





Problem Qualifiers











YASIR JONES DO                  Jan 6, 2022 15:53

## 2022-01-06 NOTE — RAD
CT HEAD/BRAIN WO



History: Reason: confusion, unwitnessed fall / Spl. Instructions:  / History: 



Comparison: None.



Technique: Noncontrast CT imaging was performed of the head.  



Exposure: One or more of the following individualized dose reduction techniques were utilized for thi
s examination:  

1. Automated exposure control  

2. Adjustment of the mA and/or kV according to patient size  

3. Use of iterative reconstruction technique.



Findings: 

No intracranial hemorrhage. No mass effect. No hydrocephalus.  Extra-axial spaces are unremarkable.



Imaged orbits are unremarkable. Imaged paranasal sinuses and mastoid air cells are clear. No acute ca
lvarial fracture.



Impression:

1.  No acute intracranial abnormality. 



Electronically signed by: Edi Landeros DO (1/6/2022 4:38 PM) [facility]

## 2022-01-06 NOTE — EKG
[facility] [address], [place] [address]

Test Date:    2022               Test Time:    16:37:40

Pat Name:     ANTONELLA TAYLOR             Department:   

Patient ID:   [id number]           Room:          

Gender:       F                        Technician:   CHRISTINE

:          2000               Requested By: YASIR JONES

Order Number: [id number]            Reading MD:   Chriss Mast

                                 Measurements

Intervals                              Axis          

Rate:         91                       P:            64

KY:           130                      QRS:          55

QRSD:         98                       T:            18

QT:           344                                    

QTc:          425                                    

                           Interpretive Statements

SINUS RHYTHM

INCOMPLETE RIGHT BUNDLE BRANCH BLOCK

Electronically Signed On 2022 14:31:13 CST by Chriss Mast

## 2022-01-06 NOTE — RAD
XR CHEST 1V



History: Reason: confusion / Spl. Instructions:  / History: 



Comparison: March 31, 2021



Findings:

No consolidation or pleural effusion. Normal heart size. No pneumothorax.



Impression: 

1.  No acute cardiopulmonary process.



Electronically signed by: Edi Landeros DO (1/6/2022 4:34 PM) [facility]

## 2022-01-07 ENCOUNTER — HOSPITAL ENCOUNTER (OUTPATIENT)
Dept: HOSPITAL 63 - LAB | Age: 22
End: 2022-01-07
Attending: FAMILY MEDICINE
Payer: COMMERCIAL

## 2022-01-07 DIAGNOSIS — R11.0: Primary | ICD-10-CM

## 2022-01-07 DIAGNOSIS — R53.83: ICD-10-CM

## 2022-01-07 DIAGNOSIS — E86.0: ICD-10-CM

## 2022-01-07 DIAGNOSIS — R53.1: ICD-10-CM

## 2022-01-07 LAB
ALBUMIN SERPL-MCNC: 3.9 G/DL (ref 3.4–5)
ALBUMIN/GLOB SERPL: 1.1 {RATIO} (ref 1–1.7)
ALP SERPL-CCNC: 35 U/L (ref 46–116)
ALT SERPL-CCNC: 49 U/L (ref 14–59)
ANION GAP SERPL CALC-SCNC: 11 MMOL/L (ref 6–14)
AST SERPL-CCNC: 55 U/L (ref 15–37)
BASOPHILS # BLD AUTO: 0.1 X10^3/UL (ref 0–0.2)
BASOPHILS NFR BLD: 2 % (ref 0–3)
BILIRUB SERPL-MCNC: 0.3 MG/DL (ref 0.2–1)
BUN/CREAT SERPL: 10 (ref 6–20)
CA-I SERPL ISE-MCNC: 8 MG/DL (ref 7–20)
CALCIUM SERPL-MCNC: 8.2 MG/DL (ref 8.5–10.1)
CHLORIDE SERPL-SCNC: 104 MMOL/L (ref 98–107)
CO2 SERPL-SCNC: 23 MMOL/L (ref 21–32)
CREAT SERPL-MCNC: 0.8 MG/DL (ref 0.6–1)
CRP SERPL-MCNC: 5.4 MG/L (ref 0–3.3)
EOSINOPHIL NFR BLD: 0.1 X10^3/UL (ref 0–0.7)
EOSINOPHIL NFR BLD: 3 % (ref 0–3)
ERYTHROCYTE [DISTWIDTH] IN BLOOD BY AUTOMATED COUNT: 12.8 % (ref 11.5–14.5)
ERYTHROCYTE [SEDIMENTATION RATE] IN BLOOD: 19 MM/H (ref 0–25)
GFR SERPLBLD BASED ON 1.73 SQ M-ARVRAT: 89.7 ML/MIN
GLOBULIN SER-MCNC: 3.7 G/DL (ref 2.2–3.8)
GLUCOSE SERPL-MCNC: 83 MG/DL (ref 70–99)
HCT VFR BLD CALC: 36.1 % (ref 36–47)
HGB BLD-MCNC: 12.2 G/DL (ref 12–15.5)
LYMPHOCYTES # BLD: 1.5 X10^3/UL (ref 1–4.8)
LYMPHOCYTES NFR BLD AUTO: 38 % (ref 24–48)
MAGNESIUM SERPL-MCNC: 2.1 MG/DL (ref 1.8–2.4)
MCH RBC QN AUTO: 31 PG (ref 25–35)
MCHC RBC AUTO-ENTMCNC: 34 G/DL (ref 31–37)
MCV RBC AUTO: 92 FL (ref 79–100)
MONO #: 0.2 X10^3/UL (ref 0–1.1)
MONOCYTES NFR BLD: 6 % (ref 0–9)
MONONUCLEOSIS PATIENT: NEGATIVE
NEUT #: 2.1 X10^3UL (ref 1.8–7.7)
NEUTROPHILS NFR BLD AUTO: 51 % (ref 31–73)
PLATELET # BLD AUTO: 360 X10^3/UL (ref 140–400)
POTASSIUM SERPL-SCNC: 3.8 MMOL/L (ref 3.5–5.1)
PROT SERPL-MCNC: 7.6 G/DL (ref 6.4–8.2)
RBC # BLD AUTO: 3.95 X10^6/UL (ref 3.5–5.4)
SODIUM SERPL-SCNC: 138 MMOL/L (ref 136–145)
WBC # BLD AUTO: 4 X10^3/UL (ref 4–11)

## 2022-01-07 PROCEDURE — 83735 ASSAY OF MAGNESIUM: CPT

## 2022-01-07 PROCEDURE — 83970 ASSAY OF PARATHORMONE: CPT

## 2022-01-07 PROCEDURE — 86140 C-REACTIVE PROTEIN: CPT

## 2022-01-07 PROCEDURE — 36415 COLL VENOUS BLD VENIPUNCTURE: CPT

## 2022-01-07 PROCEDURE — 84443 ASSAY THYROID STIM HORMONE: CPT

## 2022-01-07 PROCEDURE — 86038 ANTINUCLEAR ANTIBODIES: CPT

## 2022-01-07 PROCEDURE — 84439 ASSAY OF FREE THYROXINE: CPT

## 2022-01-07 PROCEDURE — 85025 COMPLETE CBC W/AUTO DIFF WBC: CPT

## 2022-01-07 PROCEDURE — 80053 COMPREHEN METABOLIC PANEL: CPT

## 2022-01-07 PROCEDURE — 82103 ALPHA-1-ANTITRYPSIN TOTAL: CPT

## 2022-01-07 PROCEDURE — 86308 HETEROPHILE ANTIBODY SCREEN: CPT

## 2022-01-07 PROCEDURE — 82104 ALPHA-1-ANTITRYPSIN PHENO: CPT

## 2022-01-07 PROCEDURE — 86376 MICROSOMAL ANTIBODY EACH: CPT

## 2022-01-07 PROCEDURE — 85651 RBC SED RATE NONAUTOMATED: CPT

## 2022-01-08 LAB
CALCIUM PTH: 8.8 MG/DL (ref 8.7–10.2)
CREATININE PTH: 0.68 MG/DL (ref 0.57–1)
LKM AB SER-ACNC: <8 IU/ML (ref 0–34)
PTH-INTACT SERPL-MCNC: 38 PG/ML (ref 15–65)
T4 FREE SERPL-MCNC: 1.16 NG/DL (ref 0.76–1.46)
THYROID STIM HORMONE (TSH): 1.01 UIU/ML (ref 0.36–3.74)